# Patient Record
Sex: MALE | Race: WHITE | Employment: STUDENT | URBAN - METROPOLITAN AREA
[De-identification: names, ages, dates, MRNs, and addresses within clinical notes are randomized per-mention and may not be internally consistent; named-entity substitution may affect disease eponyms.]

---
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
## 2021-10-28 ENCOUNTER — HOSPITAL ENCOUNTER (EMERGENCY)
Age: 21
Discharge: HOME | End: 2021-10-28
Attending: EMERGENCY MEDICINE
Payer: COMMERCIAL

## 2021-10-28 ENCOUNTER — APPOINTMENT (OUTPATIENT)
Dept: CT IMAGING | Age: 21
End: 2021-10-28
Attending: EMERGENCY MEDICINE
Payer: COMMERCIAL

## 2021-10-28 VITALS
TEMPERATURE: 98.7 F | WEIGHT: 162.92 LBS | DIASTOLIC BLOOD PRESSURE: 79 MMHG | HEIGHT: 72 IN | SYSTOLIC BLOOD PRESSURE: 124 MMHG | HEART RATE: 73 BPM | RESPIRATION RATE: 20 BRPM | OXYGEN SATURATION: 98 % | BODY MASS INDEX: 22.07 KG/M2

## 2021-10-28 DIAGNOSIS — B34.9 VIRAL SYNDROME: Primary | ICD-10-CM

## 2021-10-28 LAB
FLUAV AG NPH QL IA: NEGATIVE
FLUBV AG NOSE QL IA: NEGATIVE
SARS-COV-2, COV2: NORMAL

## 2021-10-28 PROCEDURE — 74176 CT ABD & PELVIS W/O CONTRAST: CPT

## 2021-10-28 PROCEDURE — 87804 INFLUENZA ASSAY W/OPTIC: CPT

## 2021-10-28 PROCEDURE — U0005 INFEC AGEN DETEC AMPLI PROBE: HCPCS

## 2021-10-28 PROCEDURE — 99284 EMERGENCY DEPT VISIT MOD MDM: CPT

## 2021-10-28 NOTE — ED NOTES
Pt had ct scan, results reviewed with pt by MD. Md reviewed updated d/c instructions with patient. Patient ambulatory at discharge.

## 2021-10-28 NOTE — DISCHARGE INSTRUCTIONS
Isolate at home until you receive the results of your Covid test.  Go under my chart to find your results. Follow-up with your primary care doctor. Return emergency department for new or worsening symptoms.

## 2021-10-28 NOTE — ED PROVIDER NOTES
27-year-old previously healthy male presents with body aches, headache, fevers since Saturday. Today he had a fever of 102 at home. He did home Covid test which was negative. He did have COVID-19 in September. He denies any cough. He has had some generalized abdominal pain. He also endorses diarrhea. He denies any nausea or vomiting. Past Medical History:   Diagnosis Date    COVID-19     early september    H/O seasonal allergies        Past Surgical History:   Procedure Laterality Date    HX WISDOM TEETH EXTRACTION           Family History:   Problem Relation Age of Onset    No Known Problems Mother     No Known Problems Father        Social History     Socioeconomic History    Marital status: SINGLE     Spouse name: Not on file    Number of children: Not on file    Years of education: Not on file    Highest education level: Not on file   Occupational History    Not on file   Tobacco Use    Smoking status: Never Smoker    Smokeless tobacco: Never Used   Substance and Sexual Activity    Alcohol use: No    Drug use: No    Sexual activity: Never   Other Topics Concern    Not on file   Social History Narrative    Not on file     Social Determinants of Health     Financial Resource Strain:     Difficulty of Paying Living Expenses:    Food Insecurity:     Worried About Running Out of Food in the Last Year:     Ran Out of Food in the Last Year:    Transportation Needs:     Lack of Transportation (Medical):      Lack of Transportation (Non-Medical):    Physical Activity:     Days of Exercise per Week:     Minutes of Exercise per Session:    Stress:     Feeling of Stress :    Social Connections:     Frequency of Communication with Friends and Family:     Frequency of Social Gatherings with Friends and Family:     Attends Yarsanism Services:     Active Member of Clubs or Organizations:     Attends Club or Organization Meetings:     Marital Status:    Intimate Partner Violence:     Fear of Current or Ex-Partner:     Emotionally Abused:     Physically Abused:     Sexually Abused: ALLERGIES: Patient has no known allergies. Review of Systems   All other systems reviewed and are negative. Vitals:    10/28/21 0913 10/28/21 0915 10/28/21 0939 10/28/21 0941   BP:  120/80 124/79    Pulse:       Resp:       Temp:       SpO2: 99% 100%  98%   Weight:       Height:                Physical Exam  Vitals and nursing note reviewed. Constitutional:       General: He is not in acute distress. HENT:      Head: Normocephalic and atraumatic. Mouth/Throat:      Pharynx: No oropharyngeal exudate. Eyes:      General: No scleral icterus. Conjunctiva/sclera: Conjunctivae normal.      Pupils: Pupils are equal, round, and reactive to light. Neck:      Trachea: No tracheal deviation. Cardiovascular:      Rate and Rhythm: Normal rate and regular rhythm. Pulmonary:      Effort: Pulmonary effort is normal. No respiratory distress. Breath sounds: Normal breath sounds. No stridor. No wheezing, rhonchi or rales. Abdominal:      General: There is no distension. Palpations: Abdomen is soft. Tenderness: There is abdominal tenderness (generalized). There is no guarding or rebound. Genitourinary:     Comments: deferred  Musculoskeletal:         General: No deformity. Cervical back: No rigidity. Skin:     General: Skin is warm and dry. Neurological:      General: No focal deficit present. Mental Status: He is alert and oriented to person, place, and time. Psychiatric:         Mood and Affect: Mood normal.         Behavior: Behavior normal.          MDM   diff dx: influenza, COVID-19, viral syndrome. No focal abdominal tenderness, rebound, guarding to suggest appendicitis. Will obtain flu swab and if negative obtain discharge Covid swab. Patient well-appearing with normal vital signs and afebrile. Procedures               9:13 AM  Patient re-evaluated.   Patient has some minimal tenderness in the left lower quadrant. CT was performed per his mother's request as she was concerned that we did work-up his abdominal pain. CT scan returned with minimal bilateral pleural effusions likely secondary to viral syndrome. All questions answered. Patient appropriate for discharge. Given return precautions and follow up instructions. LABORATORY TESTS:  Labs Reviewed   INFLUENZA A+B VIRAL AGS   SARS-COV-2   SARS-COV-2       IMAGING RESULTS:  CT ABD PELV WO CONT   Final Result   1. Trace bilateral pleural fluid. 2. No renal calculi or evidence of obstructive uropathy. MEDICATIONS GIVEN:  Medications - No data to display    IMPRESSION:  1. Viral syndrome        PLAN:  1. Current Discharge Medication List        2. Follow-up Information     Follow up With Specialties Details Why Contact Info    Rajinder Payton MD Family Medicine Schedule an appointment as soon as possible for a visit   [facility] [address] DEP Emergency Medicine  If symptoms worsen or new concerns such as pain in the right lower quadrant, fever that does not come down with Motrin or Tylenol, vomiting [facility] TREATMENT [facility] [phone] [id number] [phone]        3. COVID-19 test pending. Isolation instructions given    Return to ED for new or worsening symptoms       Michel Faustin MD        Please note that this dictation was completed with [employer], the computer voice recognition software. Quite often unanticipated grammatical, syntax, homophones, and other interpretive errors are inadvertently transcribed by the computer software. Please disregard these errors. Please excuse any errors that have escaped final proofreading.

## 2021-10-28 NOTE — ED TRIAGE NOTES
Patient reports he had covid in early September. Per Mother she did a home test last night that was negative. Has been fever that was 102.0 by ear thermometer at home today. Tylenol given at home. Temp on arrival to er 98.7 oral.   He also reports body aches, skin is clammy.

## 2021-10-28 NOTE — ED NOTES
Pt was given discharge instructions , verbalized understanding of discharge instructions and left room ambulatory to waiting room. Patient's Mother in waiting room became upset that he did not have enough of a work up and insisted on speaking with provider. MD notified and went out to speak with patient and mother. Patient was then brought back in to ed bed.

## 2021-10-29 ENCOUNTER — PATIENT OUTREACH (OUTPATIENT)
Dept: CASE MANAGEMENT | Age: 21
End: 2021-10-29

## 2021-10-29 NOTE — PROGRESS NOTES
10/29/2021  11:08 AM    Patient contacted regarding COVID-19 risk. Discussed COVID-19 related testing which was pending at this time. Test results were pending. Patient informed of results, if available? Result Pending. Patient outreach attempt by this ACM today to perform initial post-discharge assessment; lvm requesting a return phone call to this ACM.

## 2021-10-30 LAB
SARS-COV-2, XPLCVT: NOT DETECTED
SOURCE, COVRS: NORMAL

## 2021-11-01 NOTE — PROGRESS NOTES
11/1/2021  3:43 PM    Patient contacted regarding COVID-19 risk. Discussed COVID-19 related testing which was available at this time. Test results were negative. Patient informed of results, if available? no.       Second patient outreach attempt by this AC to perform initial post-discharge assessment; lvm requesting a return phone call to this ACM. [employer] message routed to patient with COVID-19 resources. COVID Care Transitions episode resolved at this time due to ACM inability to reach patient.

## 2021-11-05 ENCOUNTER — PATIENT OUTREACH (OUTPATIENT)
Dept: CASE MANAGEMENT | Age: 21
End: 2021-11-05

## 2021-11-05 NOTE — PROGRESS NOTES
2021  10:26 AM    Patient contacted regarding COVID-19 risk. Discussed COVID-19 related testing which was available at this time. Test results were negative. Patient informed of results, if available? yes. Ambulatory Care Manager contacted the patient by telephone to perform post discharge assessment. Verified name and  with patient as identifiers. Provided introduction to self, and explanation of the CTN/ACM role, and reason for call due to risk factors for infection and/or exposure to COVID-19. Patient denies known exposure to COVID-19. Patient is unable to follow-up with ACM via telephone at this time. Informed patient that this ACM routed him MyChart follow-up on ; pt is advised that he can communicate with this ACM for follow-up via MyChart if this is preferred method of communication. Patient was given an opportunity to verbalize any questions and concerns and agrees to contact ACM or health care provider for questions related to their healthcare. ACM provided contact information. No further follow-up call identified based on severity of symptoms and risk factors.

## 2022-10-19 NOTE — PROGRESS NOTES
Left message to call back office. Our pharmacy doesn't have the Voltaren 1% gel. Patient will like prescription sent [facility]. Is this okay? Prescription pending

## 2023-05-11 RX ORDER — LORATADINE 10 MG/1
10 TABLET ORAL
COMMUNITY